# Patient Record
Sex: MALE | Race: BLACK OR AFRICAN AMERICAN | NOT HISPANIC OR LATINO | URBAN - METROPOLITAN AREA
[De-identification: names, ages, dates, MRNs, and addresses within clinical notes are randomized per-mention and may not be internally consistent; named-entity substitution may affect disease eponyms.]

---

## 2023-04-17 ENCOUNTER — OUTPATIENT (OUTPATIENT)
Dept: OUTPATIENT SERVICES | Facility: HOSPITAL | Age: 38
LOS: 1 days | Discharge: HOME | End: 2023-04-17
Payer: COMMERCIAL

## 2023-04-17 VITALS
SYSTOLIC BLOOD PRESSURE: 115 MMHG | HEART RATE: 75 BPM | TEMPERATURE: 98 F | DIASTOLIC BLOOD PRESSURE: 75 MMHG | OXYGEN SATURATION: 97 % | RESPIRATION RATE: 12 BRPM

## 2023-04-17 VITALS
OXYGEN SATURATION: 99 % | TEMPERATURE: 98 F | DIASTOLIC BLOOD PRESSURE: 80 MMHG | RESPIRATION RATE: 16 BRPM | SYSTOLIC BLOOD PRESSURE: 124 MMHG | HEIGHT: 65 IN | HEART RATE: 60 BPM | WEIGHT: 164.46 LBS

## 2023-04-17 DEVICE — MESH HERNIA INGUINAL 3DMAX LARGE 4 X 6" RIGHT: Type: IMPLANTABLE DEVICE | Site: BILATERAL | Status: FUNCTIONAL

## 2023-04-17 DEVICE — MESH HERNIA INGUINAL 3DMAX LARGE 4 X 6" LEFT: Type: IMPLANTABLE DEVICE | Site: BILATERAL | Status: FUNCTIONAL

## 2023-04-17 DEVICE — ETHICON HERNIA SECURESTRAP 5MM SIZE 25: Type: IMPLANTABLE DEVICE | Site: BILATERAL | Status: FUNCTIONAL

## 2023-04-17 RX ORDER — DOCUSATE SODIUM 100 MG
1 CAPSULE ORAL
Qty: 45 | Refills: 0
Start: 2023-04-17

## 2023-04-17 RX ORDER — ONDANSETRON 8 MG/1
4 TABLET, FILM COATED ORAL ONCE
Refills: 0 | Status: DISCONTINUED | OUTPATIENT
Start: 2023-04-17 | End: 2023-04-17

## 2023-04-17 RX ORDER — OXYCODONE HYDROCHLORIDE 5 MG/1
5 TABLET ORAL ONCE
Refills: 0 | Status: DISCONTINUED | OUTPATIENT
Start: 2023-04-17 | End: 2023-04-17

## 2023-04-17 RX ORDER — APREPITANT 80 MG/1
40 CAPSULE ORAL ONCE
Refills: 0 | Status: COMPLETED | OUTPATIENT
Start: 2023-04-17 | End: 2023-04-17

## 2023-04-17 RX ORDER — OXYCODONE HYDROCHLORIDE 5 MG/1
1 TABLET ORAL
Qty: 15 | Refills: 0
Start: 2023-04-17

## 2023-04-17 RX ORDER — SODIUM CHLORIDE 9 MG/ML
250 INJECTION, SOLUTION INTRAVENOUS
Refills: 0 | Status: DISCONTINUED | OUTPATIENT
Start: 2023-04-17 | End: 2023-04-17

## 2023-04-17 RX ORDER — FENTANYL CITRATE 50 UG/ML
25 INJECTION INTRAVENOUS
Refills: 0 | Status: DISCONTINUED | OUTPATIENT
Start: 2023-04-17 | End: 2023-04-17

## 2023-04-17 RX ORDER — ACETAMINOPHEN 500 MG
1000 TABLET ORAL ONCE
Refills: 0 | Status: COMPLETED | OUTPATIENT
Start: 2023-04-17 | End: 2023-04-17

## 2023-04-17 RX ORDER — CHLORHEXIDINE GLUCONATE 213 G/1000ML
1 SOLUTION TOPICAL ONCE
Refills: 0 | Status: COMPLETED | OUTPATIENT
Start: 2023-04-17 | End: 2023-04-17

## 2023-04-17 RX ORDER — ACETAMINOPHEN 500 MG
1000 TABLET ORAL ONCE
Refills: 0 | Status: DISCONTINUED | OUTPATIENT
Start: 2023-04-17 | End: 2023-04-17

## 2023-04-17 RX ADMIN — OXYCODONE HYDROCHLORIDE 5 MILLIGRAM(S): 5 TABLET ORAL at 11:44

## 2023-04-17 RX ADMIN — SODIUM CHLORIDE 100 MILLILITER(S): 9 INJECTION, SOLUTION INTRAVENOUS at 10:43

## 2023-04-17 RX ADMIN — OXYCODONE HYDROCHLORIDE 5 MILLIGRAM(S): 5 TABLET ORAL at 11:14

## 2023-04-17 RX ADMIN — CHLORHEXIDINE GLUCONATE 1 APPLICATION(S): 213 SOLUTION TOPICAL at 06:00

## 2023-04-17 RX ADMIN — APREPITANT 40 MILLIGRAM(S): 80 CAPSULE ORAL at 06:35

## 2023-04-17 RX ADMIN — Medication 1000 MILLIGRAM(S): at 06:35

## 2023-04-17 NOTE — ASU DISCHARGE PLAN (ADULT/PEDIATRIC) - CARE PROVIDER_API CALL
Jeremie Ambrose)  Surgery  [address] [place] [address], [address]  [place] [address]  Phone: [phone]  Fax: [phone]  Follow Up Time:

## 2023-04-17 NOTE — ASU DISCHARGE PLAN (ADULT/PEDIATRIC) - NS MD DC FALL RISK RISK
For information on Fall & Injury Prevention, visit: [email or web address] OR  [email or web address] OR  https://www.cdc.gov/steadi/patient.html

## 2023-04-17 NOTE — ASU DISCHARGE PLAN (ADULT/PEDIATRIC) - ASU DC SPECIAL INSTRUCTIONSFT
- You had the following surgery with Dr. Ambrose: Laparoscopic repair of bilateral inguinal hernias.  -  Take acetaminophen (325 mg x 2 tablets) and ibuprofen (200 mg x 1 tablet) every 4 hours for pain on alternating basis such that you take acetaminophen then ibuprofen 2 hours later then acetaminophen 2 hours later. The medications are effective pain control agents that act differently and so do not increase the adverse effects of the other. Please do not take more than 4000 mg of acetaminophen daily as it can be hazardous to the liver in extreme doses. Take ibuprofen as recommended as in large doses over time it can cause peptic ulcers and other problems.   - Take oxycodone (5 mg tablet) only as needed for severe pain. This is a strong opiate pain medication and can be addicting, may make you drowsy, and will slow your bowels. Please take docusate capsules (every 8 hours) while taking oxycodone for stool softening effect.   - Please avoid submerging the incisions which are covered with skin glue (Dermabond). You may shower but do not scrub at the incisions.   - You may remove the dressing in 2 days, and you do not need to replace it unless there is drainage.   - Please call your surgeon's office for high fevers, worsening pain, redness/swelling or thick discharge from the incisions.   - Please avoid heavy lifting until evaluated by your surgeon.   - Follow-up with the surgeon in 1-2 weeks.

## 2023-04-17 NOTE — BRIEF OPERATIVE NOTE - OPERATION/FINDINGS
Laparoscopic access obtained with cutdown technique technique in infraumbilical area, incising the anterior rectus sheath lateral to midline, placing a 10 mm Evan port and advancing balloon with dilator in extraperitoneal space. The preperitoneal space was expanded with the balloon. Two 5-mm ports were placed in midline below this spaced by 4 cm each.     Attention was then turned to the right inguinal area. The preperitoneal dissection was carried down to pubic tubercle medially. Indirect inguinal hernia were identified and contents were carefully reduced. Cord structures preserved.  Attention was turned to the left inguinal area. The preperitoneal dissection was carried down to pubic tubercle medially. Small indirect inguinal hernia were identified and contents were carefully reduced.    Mesh was then positioned. Polypropylene mesh was secured above the pubic tubercle medially with tacks, taking care to lay the mesh flatly against abdominal wall throughout. Insufflation gas was released. Fascia of the 10 mm port was closed in figure-of-eight fashion, and skin was closed with overlying SteriStrips. Local anesthetic was instilled around incisions and bilaterally medial to the pubic tubercle as a regional block.

## 2023-04-17 NOTE — BRIEF OPERATIVE NOTE - NSICDXBRIEFPOSTOP_GEN_ALL_CORE_FT
POST-OP DIAGNOSIS:  Right inguinal hernia 17-Apr-2023 10:18:07  Jair Braxton  Left inguinal hernia 17-Apr-2023 10:18:15  Jair Braxton

## 2023-04-17 NOTE — BRIEF OPERATIVE NOTE - NSICDXBRIEFPREOP_GEN_ALL_CORE_FT
PRE-OP DIAGNOSIS:  Right inguinal hernia 17-Apr-2023 10:17:50  Jair Braxton  Left inguinal hernia 17-Apr-2023 10:17:57  Jair Braxton

## 2023-04-18 PROCEDURE — 88304 TISSUE EXAM BY PATHOLOGIST: CPT | Mod: 26

## 2023-04-24 LAB — SURGICAL PATHOLOGY STUDY: SIGNIFICANT CHANGE UP

## (undated) DEVICE — SLV COMPRESSION KNEE MED

## (undated) DEVICE — GLV 8 PROTEXIS (WHITE)

## (undated) DEVICE — SUT VICRYL 2-0 18" TIES

## (undated) DEVICE — SUT VICRYL 2-0 27" SH

## (undated) DEVICE — TUBING STRYKER PNEUMOCLEAR HIGH FLOW

## (undated) DEVICE — ELCTR BOVIE TIP BLADE INSULATED 2.75" EDGE

## (undated) DEVICE — PACK GENERAL LAPAROSCOPY

## (undated) DEVICE — SUT MONOCRYL 4-0 18" PS-2

## (undated) DEVICE — SUT PROLENE 0 30" CT-2

## (undated) DEVICE — SUT VICRYL 0 27" UR-6

## (undated) DEVICE — WARMING BLANKET LOWER ADULT

## (undated) DEVICE — DRAIN PENROSE 1" X 18" LATEX

## (undated) DEVICE — DRSG TELFA 3 X 8

## (undated) DEVICE — Device

## (undated) DEVICE — ELCTR STRYKER NEPTUNE BLADE COATED, INSULATED 70MM

## (undated) DEVICE — GOWN ROYAL SILK XL

## (undated) DEVICE — POSITIONER FOAM EGG CRATE ULNAR 2PCS (PINK)

## (undated) DEVICE — DRSG MASTISOL

## (undated) DEVICE — ELCTR GROUNDING PAD ADULT COVIDIEN

## (undated) DEVICE — SPONGE ENDO PEANUT 5MM

## (undated) DEVICE — BLADE SURGICAL #15 CARBON

## (undated) DEVICE — GLV 6 PROTEXIS (WHITE)

## (undated) DEVICE — GLV 6.5 PROTEXIS (WHITE)

## (undated) DEVICE — PACK GENERAL MINOR

## (undated) DEVICE — GLV 7.5 PROTEXIS (WHITE)